# Patient Record
Sex: MALE | Race: WHITE
[De-identification: names, ages, dates, MRNs, and addresses within clinical notes are randomized per-mention and may not be internally consistent; named-entity substitution may affect disease eponyms.]

---

## 2020-12-14 ENCOUNTER — HOSPITAL ENCOUNTER (EMERGENCY)
Dept: HOSPITAL 26 - MED | Age: 70
Discharge: HOME | End: 2020-12-14
Payer: MEDICARE

## 2020-12-14 VITALS
BODY MASS INDEX: 28.44 KG/M2 | HEIGHT: 72 IN | WEIGHT: 210 LBS | DIASTOLIC BLOOD PRESSURE: 78 MMHG | SYSTOLIC BLOOD PRESSURE: 138 MMHG

## 2020-12-14 VITALS — SYSTOLIC BLOOD PRESSURE: 109 MMHG | DIASTOLIC BLOOD PRESSURE: 70 MMHG

## 2020-12-14 DIAGNOSIS — U07.1: Primary | ICD-10-CM

## 2020-12-14 DIAGNOSIS — J12.89: ICD-10-CM

## 2020-12-14 DIAGNOSIS — N18.9: ICD-10-CM

## 2020-12-14 LAB
ALBUMIN FLD-MCNC: 2.6 G/DL (ref 3.4–5)
ANION GAP SERPL CALCULATED.3IONS-SCNC: 18 MMOL/L (ref 8–16)
AST SERPL-CCNC: 32 U/L (ref 15–37)
BASOPHILS # BLD AUTO: 0 K/UL (ref 0–0.22)
BASOPHILS NFR BLD AUTO: 0.1 % (ref 0–2)
BILIRUB SERPL-MCNC: 0.6 MG/DL (ref 0–1)
BUN SERPL-MCNC: 72 MG/DL (ref 7–18)
CHLORIDE SERPL-SCNC: 103 MMOL/L (ref 98–107)
CO2 SERPL-SCNC: 20.2 MMOL/L (ref 21–32)
CREAT SERPL-MCNC: 2.3 MG/DL (ref 0.6–1.3)
DEPRECATED D DIMER PPP-ACNC: 858 NG/ML (ref 0–400)
EOSINOPHIL # BLD AUTO: 0 K/UL (ref 0–0.4)
EOSINOPHIL NFR BLD AUTO: 0 % (ref 0–4)
ERYTHROCYTE [DISTWIDTH] IN BLOOD BY AUTOMATED COUNT: 14.2 % (ref 11.6–13.7)
GFR SERPL CREATININE-BSD FRML MDRD: (no result) ML/MIN (ref 90–?)
GLUCOSE SERPL-MCNC: 150 MG/DL (ref 74–106)
HCT VFR BLD AUTO: 40.3 % (ref 36–52)
HGB BLD-MCNC: 13.2 G/DL (ref 12–18)
LYMPHOCYTES # BLD AUTO: 0.8 K/UL (ref 2–11.5)
LYMPHOCYTES NFR BLD AUTO: 14.2 % (ref 20.5–51.1)
MCH RBC QN AUTO: 30 PG (ref 27–31)
MCHC RBC AUTO-ENTMCNC: 33 G/DL (ref 33–37)
MCV RBC AUTO: 91.5 FL (ref 80–94)
MONOCYTES # BLD AUTO: 0.4 K/UL (ref 0.8–1)
MONOCYTES NFR BLD AUTO: 6.6 % (ref 1.7–9.3)
NEUTROPHILS # BLD AUTO: 4.7 K/UL (ref 1.8–7.7)
NEUTROPHILS NFR BLD AUTO: 79.1 % (ref 42.2–75.2)
PLATELET # BLD AUTO: 156 K/UL (ref 140–450)
POTASSIUM SERPL-SCNC: 4.2 MMOL/L (ref 3.5–5.1)
PROTHROMBIN TIME: 9.8 SECS (ref 10.8–13.4)
RBC # BLD AUTO: 4.4 MIL/UL (ref 4.2–6.1)
SODIUM SERPL-SCNC: 137 MMOL/L (ref 136–145)
WBC # BLD AUTO: 5.9 K/UL (ref 4.8–10.8)

## 2020-12-14 PROCEDURE — 87804 INFLUENZA ASSAY W/OPTIC: CPT

## 2020-12-14 PROCEDURE — 85025 COMPLETE CBC W/AUTO DIFF WBC: CPT

## 2020-12-14 PROCEDURE — 84484 ASSAY OF TROPONIN QUANT: CPT

## 2020-12-14 PROCEDURE — 85610 PROTHROMBIN TIME: CPT

## 2020-12-14 PROCEDURE — 71045 X-RAY EXAM CHEST 1 VIEW: CPT

## 2020-12-14 PROCEDURE — 80053 COMPREHEN METABOLIC PANEL: CPT

## 2020-12-14 PROCEDURE — 83605 ASSAY OF LACTIC ACID: CPT

## 2020-12-14 PROCEDURE — 96366 THER/PROPH/DIAG IV INF ADDON: CPT

## 2020-12-14 PROCEDURE — 96365 THER/PROPH/DIAG IV INF INIT: CPT

## 2020-12-14 PROCEDURE — 85730 THROMBOPLASTIN TIME PARTIAL: CPT

## 2020-12-14 PROCEDURE — 83880 ASSAY OF NATRIURETIC PEPTIDE: CPT

## 2020-12-14 PROCEDURE — 87426 SARSCOV CORONAVIRUS AG IA: CPT

## 2020-12-14 PROCEDURE — 85379 FIBRIN DEGRADATION QUANT: CPT

## 2020-12-14 PROCEDURE — 36415 COLL VENOUS BLD VENIPUNCTURE: CPT

## 2020-12-14 PROCEDURE — 82803 BLOOD GASES ANY COMBINATION: CPT

## 2020-12-14 PROCEDURE — 93005 ELECTROCARDIOGRAM TRACING: CPT

## 2020-12-14 PROCEDURE — 71250 CT THORAX DX C-: CPT

## 2020-12-14 PROCEDURE — 99285 EMERGENCY DEPT VISIT HI MDM: CPT

## 2020-12-14 PROCEDURE — 87040 BLOOD CULTURE FOR BACTERIA: CPT

## 2020-12-14 PROCEDURE — 36600 WITHDRAWAL OF ARTERIAL BLOOD: CPT

## 2020-12-14 NOTE — NUR
Patient unable to give urine sample, Dr. Miller said it wasn't necessary and 
patient could go home w/o sample

## 2020-12-14 NOTE — NUR
FAMILY CONTACT Pauline [phone] daughter, wife, Nini son & , updated 
regarding care and status

-------------------------------------------------------------------------------

Addendum: 12/14/20 at 1415 by [email or web address]

-------------------------------------------------------------------------------

WRONG PATIENT

## 2020-12-19 ENCOUNTER — HOSPITAL ENCOUNTER (EMERGENCY)
Dept: HOSPITAL 26 - MED | Age: 70
End: 2020-12-19
Payer: MEDICARE

## 2020-12-19 DIAGNOSIS — J18.9: ICD-10-CM

## 2020-12-19 DIAGNOSIS — I10: ICD-10-CM

## 2020-12-19 DIAGNOSIS — U07.1: Primary | ICD-10-CM

## 2020-12-19 DIAGNOSIS — E11.9: ICD-10-CM

## 2020-12-19 DIAGNOSIS — Z98.890: ICD-10-CM

## 2020-12-19 DIAGNOSIS — I46.9: ICD-10-CM
